# Patient Record
Sex: MALE | Race: WHITE | NOT HISPANIC OR LATINO | Employment: UNEMPLOYED | URBAN - METROPOLITAN AREA
[De-identification: names, ages, dates, MRNs, and addresses within clinical notes are randomized per-mention and may not be internally consistent; named-entity substitution may affect disease eponyms.]

---

## 2018-10-15 ENCOUNTER — OUTSIDE PLACE OF SERVICE (OUTPATIENT)
Dept: CARDIOLOGY | Facility: CLINIC | Age: 58
End: 2018-10-15

## 2018-10-15 ENCOUNTER — HOSPITAL ENCOUNTER (EMERGENCY)
Facility: HOSPITAL | Age: 58
Discharge: ANOTHER HOSPITAL | End: 2018-10-15
Attending: EMERGENCY MEDICINE

## 2018-10-15 VITALS
RESPIRATION RATE: 16 BRPM | BODY MASS INDEX: 30.31 KG/M2 | TEMPERATURE: 99 F | HEIGHT: 68 IN | SYSTOLIC BLOOD PRESSURE: 98 MMHG | DIASTOLIC BLOOD PRESSURE: 63 MMHG | HEART RATE: 66 BPM | OXYGEN SATURATION: 97 % | WEIGHT: 200 LBS

## 2018-10-15 DIAGNOSIS — R79.89 ELEVATED TROPONIN: ICD-10-CM

## 2018-10-15 DIAGNOSIS — R07.9 CHEST PAIN: ICD-10-CM

## 2018-10-15 DIAGNOSIS — I21.4 NSTEMI (NON-ST ELEVATED MYOCARDIAL INFARCTION): Primary | ICD-10-CM

## 2018-10-15 LAB
ALBUMIN SERPL BCP-MCNC: 3.1 G/DL
ALP SERPL-CCNC: 51 U/L
ALT SERPL W/O P-5'-P-CCNC: 28 U/L
ANION GAP SERPL CALC-SCNC: 10 MMOL/L
AST SERPL-CCNC: 33 U/L
BASOPHILS # BLD AUTO: 0.1 K/UL
BASOPHILS NFR BLD: 0.4 %
BILIRUB SERPL-MCNC: 0.5 MG/DL
BNP SERPL-MCNC: 330 PG/ML
BUN SERPL-MCNC: 24 MG/DL
CALCIUM SERPL-MCNC: 8.7 MG/DL
CHLORIDE SERPL-SCNC: 108 MMOL/L
CO2 SERPL-SCNC: 21 MMOL/L
CREAT SERPL-MCNC: 1 MG/DL
D DIMER PPP IA.FEU-MCNC: 0.64 MG/L FEU
DIFFERENTIAL METHOD: ABNORMAL
EOSINOPHIL # BLD AUTO: 0.1 K/UL
EOSINOPHIL NFR BLD: 0.5 %
ERYTHROCYTE [DISTWIDTH] IN BLOOD BY AUTOMATED COUNT: 14.9 %
EST. GFR  (AFRICAN AMERICAN): >60 ML/MIN/1.73 M^2
EST. GFR  (NON AFRICAN AMERICAN): >60 ML/MIN/1.73 M^2
GLUCOSE SERPL-MCNC: 122 MG/DL
HCT VFR BLD AUTO: 40.7 %
HGB BLD-MCNC: 13.4 G/DL
LYMPHOCYTES # BLD AUTO: 3 K/UL
LYMPHOCYTES NFR BLD: 18.2 %
MCH RBC QN AUTO: 29.6 PG
MCHC RBC AUTO-ENTMCNC: 32.9 G/DL
MCV RBC AUTO: 90 FL
MONOCYTES # BLD AUTO: 2 K/UL
MONOCYTES NFR BLD: 12.2 %
NEUTROPHILS # BLD AUTO: 11.3 K/UL
NEUTROPHILS NFR BLD: 68.7 %
PLATELET # BLD AUTO: 318 K/UL
PMV BLD AUTO: 9.1 FL
POTASSIUM SERPL-SCNC: 3.7 MMOL/L
PROT SERPL-MCNC: 6.4 G/DL
RBC # BLD AUTO: 4.53 M/UL
SODIUM SERPL-SCNC: 139 MMOL/L
TROPONIN I SERPL DL<=0.01 NG/ML-MCNC: 11.14 NG/ML
WBC # BLD AUTO: 16.5 K/UL

## 2018-10-15 PROCEDURE — 36415 COLL VENOUS BLD VENIPUNCTURE: CPT

## 2018-10-15 PROCEDURE — 93005 ELECTROCARDIOGRAM TRACING: CPT

## 2018-10-15 PROCEDURE — 80053 COMPREHEN METABOLIC PANEL: CPT

## 2018-10-15 PROCEDURE — 83880 ASSAY OF NATRIURETIC PEPTIDE: CPT

## 2018-10-15 PROCEDURE — 99291 CRITICAL CARE FIRST HOUR: CPT

## 2018-10-15 PROCEDURE — 85025 COMPLETE CBC W/AUTO DIFF WBC: CPT

## 2018-10-15 PROCEDURE — 93458 L HRT ARTERY/VENTRICLE ANGIO: CPT | Mod: 26,59,, | Performed by: INTERNAL MEDICINE

## 2018-10-15 PROCEDURE — 96374 THER/PROPH/DIAG INJ IV PUSH: CPT

## 2018-10-15 PROCEDURE — 84484 ASSAY OF TROPONIN QUANT: CPT

## 2018-10-15 PROCEDURE — 99254 IP/OBS CNSLTJ NEW/EST MOD 60: CPT | Mod: 25,,, | Performed by: INTERNAL MEDICINE

## 2018-10-15 PROCEDURE — 96375 TX/PRO/DX INJ NEW DRUG ADDON: CPT

## 2018-10-15 PROCEDURE — 93010 ELECTROCARDIOGRAM REPORT: CPT | Mod: ,,, | Performed by: INTERNAL MEDICINE

## 2018-10-15 PROCEDURE — 63600175 PHARM REV CODE 636 W HCPCS: Performed by: EMERGENCY MEDICINE

## 2018-10-15 PROCEDURE — 92920 PRQ TRLUML C ANGIOP 1ART&/BR: CPT | Mod: RC,,, | Performed by: INTERNAL MEDICINE

## 2018-10-15 PROCEDURE — 85379 FIBRIN DEGRADATION QUANT: CPT

## 2018-10-15 PROCEDURE — 25000003 PHARM REV CODE 250: Performed by: EMERGENCY MEDICINE

## 2018-10-15 RX ORDER — METOPROLOL TARTRATE 1 MG/ML
5 INJECTION, SOLUTION INTRAVENOUS
Status: DISCONTINUED | OUTPATIENT
Start: 2018-10-15 | End: 2018-10-15

## 2018-10-15 RX ORDER — ONDANSETRON 2 MG/ML
4 INJECTION INTRAMUSCULAR; INTRAVENOUS
Status: COMPLETED | OUTPATIENT
Start: 2018-10-15 | End: 2018-10-15

## 2018-10-15 RX ORDER — MORPHINE SULFATE 4 MG/ML
6 INJECTION, SOLUTION INTRAMUSCULAR; INTRAVENOUS
Status: COMPLETED | OUTPATIENT
Start: 2018-10-15 | End: 2018-10-15

## 2018-10-15 RX ADMIN — NITROGLYCERIN 1 INCH: 20 OINTMENT TOPICAL at 03:10

## 2018-10-15 RX ADMIN — ONDANSETRON 4 MG: 2 INJECTION INTRAMUSCULAR; INTRAVENOUS at 03:10

## 2018-10-15 RX ADMIN — MORPHINE SULFATE 6 MG: 4 INJECTION INTRAVENOUS at 03:10

## 2018-10-15 NOTE — ED PROVIDER NOTES
Encounter Date: 10/15/2018       History     Chief Complaint   Patient presents with    Chest Pain     Second time this week.  Intermittent this week woke pt up from sleep     This patient is a 57-year-old male presenting to the emergency department with complaints central chest pain that feels heavy awakening him from sleep tonight.  He reports also feeling shortness of breath and difficulty raising both of his arms accompanying this chest pain 2 times prior to awakening him tonight within last 3 days (both episodes spontaneously resolving).  He denies association with activity.  He reports taking 2 full aspirins prior to arrival.  He denies unilateral or bilateral lower extremity swelling or past history of DVT or PE.  He reports receiving cardiac risk stratification testing 5 years ago which was unremarkable but has not followed up with Cardiology since.  He denies accompanying risk factors including hypercholesterolemia, diabetes, smoking, hypertension.           Review of patient's allergies indicates:   Allergen Reactions    Pcn [penicillins] Nausea Only     Past Medical History:   Diagnosis Date    Gout      Past Surgical History:   Procedure Laterality Date    Spleenectomy       History reviewed. No pertinent family history.  Social History     Tobacco Use    Smoking status: Never Smoker    Smokeless tobacco: Former User   Substance Use Topics    Alcohol use: Yes    Drug use: No     Review of Systems   Constitutional: Negative for fever.   HENT: Negative for congestion.    Respiratory: Negative for shortness of breath.    Cardiovascular: Positive for chest pain. Negative for leg swelling.   Gastrointestinal: Negative for abdominal pain.   Genitourinary: Negative for dysuria.   Skin: Negative for rash.   Neurological: Negative for weakness.   Hematological: Does not bruise/bleed easily.   Psychiatric/Behavioral: Negative for confusion.       Physical Exam     Initial Vitals [10/15/18 0255]   BP Pulse  Resp Temp SpO2   (!) 123/59 73 16 98.7 °F (37.1 °C) 97 %      MAP       --         Physical Exam    Nursing note and vitals reviewed.  Constitutional: He appears well-nourished. He is not diaphoretic. No distress.   HENT:   Head: Normocephalic and atraumatic.   Eyes: Conjunctivae and EOM are normal.   Neck: Normal range of motion. Neck supple.   Cardiovascular: Normal rate and regular rhythm.   Pulmonary/Chest: No respiratory distress. He has no wheezes.   Abdominal: Bowel sounds are normal. He exhibits no distension. There is no tenderness.   Musculoskeletal: Normal range of motion. He exhibits no edema.   Neurological: He is alert and oriented to person, place, and time.   Skin: Skin is warm and dry. Capillary refill takes less than 2 seconds. No rash noted. No erythema.   Psychiatric: He has a normal mood and affect. Thought content normal.         ED Course   Procedures  Labs Reviewed - No data to display  EKG Readings: (Independently Interpreted)   Normal sinus rhythm, 71 beats per minute, possible left atrial enlargement, left ventricular hypertrophy, left axis deviation, normal intervals, T-wave inversions in 3 and AVF, no STEMI    Repeat EKG, as per [facility] request PT transfer, normal sinus rhythm, 66 beats per minute, possible left atrial enlargement, left ventricular hypertrophy, inferior infarct, age undetermined, no STEMI       Imaging Results    None          Medical Decision Making:   ED Management:  This patient was interviewed and examined emergently.  Initial EKG significant for T-wave inversions in III and AVF.  Patient took 2 full aspirins prior to arrival.  He had near complete resolution of chest pain with nitroglycerin paste, morphine and oxygen here.  CBC significant for a leukocytosis of 16,000. Stable renal function is noted with a troponin of 11.  BNP is elevated at 330 with mild edema noted on chest x-ray.  D-dimer is minimally elevated at 0.6 which I would associated with ongoing ACS/pulm  edema.  Case was discussed with Dr. Pino who requested the patient be transferred to [facility] and admitted to either his or Dr. Ramey's service.  Case additionally discussed with Dr. Adkins in the ER [facility] who accepted the patient for stat transfer.  Patient updated on the plan of care and is agreement with this disposition.  He is transferred in guarded condition.              Attending Attestation:         Attending Critical Care:   Critical Care Times:   Direct Patient Care (initial evaluation, reassessments, and time considering the case)................................................................15 minutes.   Additional History from reviewing old medical records or taking additional history from the family, EMS, PCP, etc.......................5 minutes.   Ordering, Reviewing, and Interpreting Diagnostic Studies...............................................................................................................5 minutes.   Documentation..................................................................................................................................................................................5 minutes.   Consultation with other Physicians. .................................................................................................................................................10 minutes.   Consultation with the patient's family directly relating to the patient's condition, care, and DNR status (when patient unable)......0 minutes.   Other..................................................................................................................................................................................................5 minutes.   ==============================================================  · Total Critical Care Time - exclusive of procedural time: 45 minutes.  ==============================================================  Critical care was necessary to treat or  prevent imminent or life-threatening deterioration of the following conditions: myocardial infarction.   The following critical care procedures were done by me (see procedure notes): pulse oximetry.   Critical care was time spent personally by me on the following activities: obtaining history from patient or relative, examination of patient, review of old charts, ordering lab, x-rays, and/or EKG, development of treatment plan with patient or relative, ordering and performing treatments and interventions, evaluation of patient's response to treatment, discussion with consultants, interpretation of cardiac measurements and re-evaluation of patient's conition.   Critical Care Condition: life-threatening                  Clinical Impression:   The primary encounter diagnosis was NSTEMI (non-ST elevated myocardial infarction). Diagnoses of Chest pain and Elevated troponin were also pertinent to this visit.      Disposition:   Disposition: Transferred  Condition: Serious                        Home Gil MD  10/15/18 0432       Home Gil MD  10/15/18 0442

## 2018-10-15 NOTE — ED NOTES
Pt presents with complaints of being awakened from sleep tonight with chest pain. The chest pain has occurred intermittently since last Wednesday. Pain is in the center of his chest, had associated shortness of breath yesterday but not tonight, worsens with movement or a deep breath and has no cardiac history. Denies the pain being associated with eating. EKG done and pt placed on cardiac monitor. Pt alert and oriented and in no noted distress at this time. Pt places pain level at 5/10. Neuro intact. MD to bedside for evaluation.

## 2018-10-17 ENCOUNTER — OUTSIDE PLACE OF SERVICE (OUTPATIENT)
Dept: CARDIOLOGY | Facility: CLINIC | Age: 58
End: 2018-10-17

## 2018-10-17 PROCEDURE — 93306 TTE W/DOPPLER COMPLETE: CPT | Mod: 26,,, | Performed by: INTERNAL MEDICINE

## 2018-10-17 PROCEDURE — 99232 SBSQ HOSP IP/OBS MODERATE 35: CPT | Mod: ,,, | Performed by: INTERNAL MEDICINE

## 2018-10-22 ENCOUNTER — TELEPHONE (OUTPATIENT)
Dept: CARDIOLOGY | Facility: CLINIC | Age: 58
End: 2018-10-22

## 2018-10-22 NOTE — TELEPHONE ENCOUNTER
Called and spoke with Ms. Cerrato, pts wife. She asked if we had an appt available for this Wednesday 10/24/2018 since they will be in town for another appt. I offered her a 4:15 on 10/24/18. She accepted. No further issues noted.

## 2018-10-22 NOTE — TELEPHONE ENCOUNTER
----- Message from Jenny Harris sent at 10/22/2018  8:26 AM CDT -----  Contact: Patient's wife, Blossom  Patient's wife is calling to speak with someone because the doctor saw the patient in the hospital and they were calling to schedule an appointment and the patient does not have any insurance and I explained to them about the deposit due at time of check-in.  Please call to discuss.  Call Back#[phone] or   Thanks

## 2018-10-24 ENCOUNTER — OFFICE VISIT (OUTPATIENT)
Dept: CARDIOLOGY | Facility: CLINIC | Age: 58
End: 2018-10-24

## 2018-10-24 VITALS
OXYGEN SATURATION: 95 % | HEIGHT: 68 IN | WEIGHT: 198.19 LBS | SYSTOLIC BLOOD PRESSURE: 107 MMHG | HEART RATE: 74 BPM | BODY MASS INDEX: 30.04 KG/M2 | DIASTOLIC BLOOD PRESSURE: 59 MMHG

## 2018-10-24 DIAGNOSIS — I25.10 CORONARY ARTERY DISEASE, ANGINA PRESENCE UNSPECIFIED, UNSPECIFIED VESSEL OR LESION TYPE, UNSPECIFIED WHETHER NATIVE OR TRANSPLANTED HEART: ICD-10-CM

## 2018-10-24 DIAGNOSIS — I21.19 STEMI INVOLVING OTH CORONARY ARTERY OF INFERIOR WALL: ICD-10-CM

## 2018-10-24 PROCEDURE — 99213 OFFICE O/P EST LOW 20 MIN: CPT | Mod: PBBFAC,PO | Performed by: INTERNAL MEDICINE

## 2018-10-24 PROCEDURE — 99999 PR PBB SHADOW E&M-EST. PATIENT-LVL III: CPT | Mod: PBBFAC,,, | Performed by: INTERNAL MEDICINE

## 2018-10-24 PROCEDURE — 99213 OFFICE O/P EST LOW 20 MIN: CPT | Mod: S$PBB,,, | Performed by: INTERNAL MEDICINE

## 2018-10-24 RX ORDER — METOPROLOL SUCCINATE 25 MG/1
25 TABLET, EXTENDED RELEASE ORAL DAILY
Refills: 5 | COMMUNITY
Start: 2018-10-18 | End: 2018-10-29 | Stop reason: SDUPTHER

## 2018-10-24 RX ORDER — NITROGLYCERIN 0.4 MG/1
TABLET SUBLINGUAL
Refills: 6 | COMMUNITY
Start: 2018-10-18

## 2018-10-24 RX ORDER — ATORVASTATIN CALCIUM 80 MG/1
80 TABLET, FILM COATED ORAL NIGHTLY
Refills: 5 | COMMUNITY
Start: 2018-10-18 | End: 2018-10-29 | Stop reason: SDUPTHER

## 2018-10-24 RX ORDER — ASPIRIN 81 MG/1
81 TABLET ORAL DAILY
Qty: 90 TABLET | Refills: 3 | COMMUNITY
Start: 2018-10-24 | End: 2018-10-29 | Stop reason: SDUPTHER

## 2018-10-24 RX ORDER — FUROSEMIDE 20 MG/1
20 TABLET ORAL DAILY
Refills: 5 | COMMUNITY
Start: 2018-10-18

## 2018-10-24 RX ORDER — TICAGRELOR 90 MG/1
90 TABLET ORAL 2 TIMES DAILY
Refills: 5 | COMMUNITY
Start: 2018-10-18 | End: 2018-10-29 | Stop reason: SDUPTHER

## 2018-10-25 PROBLEM — I21.19 STEMI INVOLVING OTH CORONARY ARTERY OF INFERIOR WALL: Status: ACTIVE | Noted: 2018-10-25

## 2018-10-25 PROBLEM — I25.10 CORONARY ARTERY DISEASE: Status: ACTIVE | Noted: 2018-10-25

## 2018-10-25 NOTE — PROGRESS NOTES
[facility]    CC:  Follow-up appointment  Chief Complaint   Patient presents with    [facility] follow up       Patient ID: Helio Reynoso is a 57 y.o. male with a past medical history of delayed presentation ST-elevation MI    HPI  He presented 5 days after having acute myocardial infarction.  His right coronary artery had 100% occlusion distally.  I  ballooned it but I was unable to open it up and put a stent in there.  He is doing well denies any chest pain, shortness of breath, orthopnea, PND, syncope or presyncope.      Past Medical History:   Diagnosis Date    Gout      Past Surgical History:   Procedure Laterality Date    Spleenectomy       Social History     Socioeconomic History    Marital status:      Spouse name: Not on file    Number of children: Not on file    Years of education: Not on file    Highest education level: Not on file   Social Needs    Financial resource strain: Not on file    Food insecurity - worry: Not on file    Food insecurity - inability: Not on file    Transportation needs - medical: Not on file    Transportation needs - non-medical: Not on file   Occupational History    Not on file   Tobacco Use    Smoking status: Never Smoker    Smokeless tobacco: Former User   Substance and Sexual Activity    Alcohol use: Yes    Drug use: No    Sexual activity: Not on file   Other Topics Concern    Not on file   Social History Narrative    Not on file     History reviewed. No pertinent family history.    Review of patient's allergies indicates:   Allergen Reactions    Pcn [penicillins] Nausea Only          Medication List           Accurate as of 10/24/18 11:59 PM. If you have any questions, ask your nurse or doctor.               START taking these medications    aspirin 81 MG EC tablet  Commonly known as:  ECOTRIN  Take 1 tablet (81 mg total) by mouth once daily.  Started by:  Viet Ramey MD        CONTINUE taking these medications    atorvastatin 80  "MG tablet  Commonly known as:  LIPITOR     BRILINTA 90 mg tablet  Generic drug:  ticagrelor     furosemide 20 MG tablet  Commonly known as:  LASIX     metoprolol succinate 25 MG 24 hr tablet  Commonly known as:  TOPROL-XL     nitroGLYCERIN 0.4 MG SL tablet  Commonly known as:  NITROSTAT           Where to Get Your Medications      You can get these medications from any pharmacy    You don't need a prescription for these medications  · aspirin 81 MG EC tablet         Review of Systems  Constitution: Denies chills, fever, and sweats.  HENT: Denies headaches or blurry vision.  Cardiovascular: Denies chest pain or irregular heart beat.  Respiratory: Denies cough or shortness of breath.  Gastrointestinal: Denies abdominal pain, nausea, or vomiting.  Musculoskeletal: Denies muscle cramps.  Neurological: Denies dizziness or focal weakness.  Psychiatric/Behavioral: Normal mental status.  Hematologic/Lymphatic: Denies bleeding problem or easy bruising/bleeding.  Skin: Denies rash or suspicious lesions    Physical Examination  BP (!) 107/59   Pulse 74   Ht 5' 8" (1.727 m)   Wt 89.9 kg (198 lb 3.1 oz)   SpO2 95%   BMI 30.14 kg/m²     Constitutional: No acute distress, conversant  HEENT: Sclera anicteric, Pupils equal, round and reactive to light, extraocular motions intact, Oropharynx clear  Neck: No JVD, no carotid bruits  Cardiovascular: regular rate and rhythm, no murmur, rubs or gallops, normal S1/S2  Pulmonary: Clear to auscultation bilaterally  Abdominal: Abdomen soft, nontender, nondistended, positive bowel sounds  Extremities: No lower extremity edema,   Pulses:  Carotid pulses are 2+ on the right side, and 2+ on the left side.  Radial pulses are 2+ on the right side, and 2+ on the left side.      Skin: No ecchymosis, erythema, or ulcers  Psych: Alert and oriented x 3, appropriate affect  Neuro: CNII-XII intact, no focal deficits    Labs:  Most Recent Data  CBC:   Lab Results   Component Value Date    WBC 16.50 " (H) 10/15/2018    HGB 13.4 (L) 10/15/2018    HCT 40.7 10/15/2018     10/15/2018    MCV 90 10/15/2018    RDW 14.9 (H) 10/15/2018     BMP:   Lab Results   Component Value Date     10/15/2018    K 3.7 10/15/2018     10/15/2018    CO2 21 (L) 10/15/2018    BUN 24 (H) 10/15/2018    CREATININE 1.0 10/15/2018     (H) 10/15/2018    CALCIUM 8.7 10/15/2018     LFTS;   Lab Results   Component Value Date    PROT 6.4 10/15/2018    ALBUMIN 3.1 (L) 10/15/2018    BILITOT 0.5 10/15/2018    AST 33 10/15/2018    ALKPHOS 51 (L) 10/15/2018    ALT 28 10/15/2018     COAGS: No results found for: INR, PROTIME, PTT  FLP: No results found for: CHOL, HDL, LDLCALC, TRIG, CHOLHDL  CARDIAC:   Lab Results   Component Value Date    TROPONINI 11.139 (H) 10/15/2018     (H) 10/15/2018       Imaging:    EKG:  Normal sinus rhythm.  Inferior infarct age indeterminate    Echo:  Inferior hypokinesis.  Ejection fraction 50-55%      I have personally reviewed these images and echo data    Assessment/Plan:  Helio was seen today for [facility] follow up.    Diagnoses and all orders for this visit:    Coronary artery disease, angina presence unspecified, unspecified vessel or lesion type, unspecified whether native or transplanted heart    STEMI involving oth coronary artery of inferior wall    Other orders  -     aspirin (ECOTRIN) 81 MG EC tablet; Take 1 tablet (81 mg total) by mouth once daily.      Continue medical management.  Cardiac rehabilitation.  I went over all his medications.  He stated that he does not want any information passed on to anyone apart from the patient and the wife.  During his myocardial infarction his wife was out of state in [place] and hence it was very difficult to reach out to her.          Total duration of face to face visit time 15 minutes.  Total time spent counseling greater than fifty percent of total visit time.  Counseling included discussion regarding imaging findings, diagnosis,  possibilities, treatment options, risks and benefits.  The patient had many questions regarding the options and long-term effect which were all answered to my best ability.      Viet Ramey MD,MRCP,RPVI,FACC,FSCAI.  Interventional Cardiology   Phone [phone]

## 2018-10-29 RX ORDER — ATORVASTATIN CALCIUM 80 MG/1
80 TABLET, FILM COATED ORAL NIGHTLY
Qty: 90 TABLET | Refills: 5 | Status: SHIPPED | OUTPATIENT
Start: 2018-10-29

## 2018-10-29 RX ORDER — ASPIRIN 81 MG/1
81 TABLET ORAL DAILY
Qty: 90 TABLET | Refills: 3 | COMMUNITY
Start: 2018-10-29 | End: 2019-10-29

## 2018-10-29 RX ORDER — TICAGRELOR 90 MG/1
90 TABLET ORAL 2 TIMES DAILY
Qty: 90 TABLET | Refills: 5 | Status: SHIPPED | OUTPATIENT
Start: 2018-10-29

## 2018-10-29 RX ORDER — METOPROLOL SUCCINATE 25 MG/1
25 TABLET, EXTENDED RELEASE ORAL DAILY
Qty: 90 TABLET | Refills: 5 | Status: SHIPPED | OUTPATIENT
Start: 2018-10-29

## 2019-08-13 ENCOUNTER — TELEPHONE (OUTPATIENT)
Dept: CARDIOLOGY | Facility: CLINIC | Age: 59
End: 2019-08-13

## 2019-08-13 NOTE — TELEPHONE ENCOUNTER
Called and l/m for Mr. Reynoso in regards to establishing care with another provider and refill of Brilinta